# Patient Record
Sex: MALE | Race: OTHER | NOT HISPANIC OR LATINO | URBAN - METROPOLITAN AREA
[De-identification: names, ages, dates, MRNs, and addresses within clinical notes are randomized per-mention and may not be internally consistent; named-entity substitution may affect disease eponyms.]

---

## 2023-12-19 ENCOUNTER — HOSPITAL ENCOUNTER (EMERGENCY)
Facility: HOSPITAL | Age: 23
Discharge: HOME | End: 2023-12-19
Attending: EMERGENCY MEDICINE | Admitting: EMERGENCY MEDICINE
Payer: COMMERCIAL

## 2023-12-19 VITALS
TEMPERATURE: 98.3 F | RESPIRATION RATE: 18 BRPM | SYSTOLIC BLOOD PRESSURE: 118 MMHG | DIASTOLIC BLOOD PRESSURE: 58 MMHG | HEART RATE: 111 BPM | OXYGEN SATURATION: 100 %

## 2023-12-19 DIAGNOSIS — F10.929 ALCOHOL INTOXICATION (HCC): Primary | ICD-10-CM

## 2023-12-19 PROCEDURE — 99284 EMERGENCY DEPT VISIT MOD MDM: CPT

## 2023-12-19 PROCEDURE — 99283 EMERGENCY DEPT VISIT LOW MDM: CPT | Performed by: EMERGENCY MEDICINE

## 2023-12-19 NOTE — ED PROVIDER NOTES
History  Chief Complaint   Patient presents with    Alcohol Intoxication     Pt brought by EMS from [facility] for intoxication.     23-year-old male with no pertinent past medical history presenting emergency department after acute intoxication at the [facility] today.  Patient drank an unknown amount of alcohol and then was stumbling in the [facility] so EMS was called.  He denies other drug or substance use.  He does not how much he drank.  He denies any complaints otherwise at this time such as musculoskeletal pains, headache, nausea, vomiting, chest pain, shortness of breath, abdominal pain, or others.    None       No past medical history on file.    No past surgical history on file.    No family history on file.  I have reviewed and agree with the history as documented.    E-Cigarette/Vaping    E-Cigarette Use Never User      E-Cigarette/Vaping Substances     Social History     Tobacco Use    Smoking status: Never    Smokeless tobacco: Never   Vaping Use    Vaping status: Never Used   Substance Use Topics    Alcohol use: Yes     Comment: poor historian    Drug use: Never        Review of Systems   All other systems reviewed and are negative.      Physical Exam  ED Triage Vitals [12/19/23 1756]   Temperature Pulse Respirations Blood Pressure SpO2   98.3 °F (36.8 °C) (!) 111 18 118/58 100 %      Temp src Heart Rate Source Patient Position - Orthostatic VS BP Location FiO2 (%)   -- -- -- -- --      Pain Score       --             Orthostatic Vital Signs  Vitals:    12/19/23 1756   BP: 118/58   Pulse: (!) 111       Physical Exam  Vitals and nursing note reviewed.   Constitutional:       General: He is not in acute distress.     Appearance: He is well-developed.   HENT:      Head: Normocephalic and atraumatic.   Eyes:      Conjunctiva/sclera: Conjunctivae normal.   Cardiovascular:      Rate and Rhythm: Normal rate and regular rhythm.      Heart sounds: No murmur heard.  Pulmonary:      Effort: Pulmonary effort is normal. No  respiratory distress.      Breath sounds: Normal breath sounds.   Abdominal:      Palpations: Abdomen is soft.      Tenderness: There is no abdominal tenderness.   Musculoskeletal:         General: No swelling.      Cervical back: Neck supple.   Skin:     General: Skin is warm and dry.      Capillary Refill: Capillary refill takes less than 2 seconds.   Neurological:      General: No focal deficit present.      Mental Status: He is alert.   Psychiatric:      Comments: Intoxicated         ED Medications  Medications - No data to display    Diagnostic Studies  Results Reviewed       None                   No orders to display         Procedures  Procedures      ED Course  ED Course as of 12/27/23 0845   Tue Dec 19, 2023   1806 Will monitor in the emergency department until clinically sober                             SBIRT 22yo+      Flowsheet Row Most Recent Value   Initial Alcohol Screen: US AUDIT-C     1. How often do you have a drink containing alcohol? 0 Filed at: 12/19/2023 1758   Audit-C Score 0 Filed at: 12/19/2023 1758   ARSLAN: How many times in the past year have you...    Used an illegal drug or used a prescription medication for non-medical reasons? Never Filed at: 12/19/2023 1758                  Medical Decision Making  23-year-old male presenting to the emergency department due to acute intoxication.  Plan to be monitor patient until clinically sober and appropriate for discharge.  His rehab coordinator did come to the hospital and was able to set up a ride to detox within their system on discharge.  Patient had appropriate return to baseline and was discharged to a detox facility.          Disposition  Final diagnoses:   Alcohol intoxication (HCC)     Time reflects when diagnosis was documented in both MDM as applicable and the Disposition within this note       Time User Action Codes Description Comment    12/19/2023  6:45 PM Breezy Lind Add [F10.929] Alcohol intoxication (HCC)           ED  Disposition       ED Disposition   Discharge    Condition   Stable    Date/Time   Tue Dec 19, 2023 1230    Comment   Dave Ocampo discharge to home/self care.                   Follow-up Information       Follow up With Specialties Details Why Contact Info    [employer]    [phone]              There are no discharge medications for this patient.    No discharge procedures on file.    PDMP Review       None             ED Provider  Attending physically available and evaluated Dave Ocampo. I managed the patient along with the ED Attending.    Electronically Signed by           Breezy Lind MD  12/27/23 2860

## 2023-12-19 NOTE — ED ATTENDING ATTESTATION
12/19/2023  I, Hardeep Diallo MD, saw and evaluated the patient. I have discussed the patient with the resident/non-physician practitioner and agree with the resident's/non-physician practitioner's findings, Plan of Care, and MDM as documented in the resident's/non-physician practitioner's note, except where noted. All available labs and Radiology studies were reviewed.  I was present for key portions of any procedure(s) performed by the resident/non-physician practitioner and I was immediately available to provide assistance.       At this point I agree with the current assessment done in the Emergency Department.  I have conducted an independent evaluation of this patient a history and physical is as follows:  Etoh   intoxication     no injury  no ha   No vomiting or abd pain   Exam the patient is in no acute distress vital signs are stable afebrile HEENT exam normocephalic atraumatic lungs clear heart regular abdomen soft nontender neurologic Bill nonfocal  Impression alcohol intoxication  ED Course         Critical Care Time  Procedures

## 2023-12-19 NOTE — ED NOTES
Visitor at bedside, Osmar the admissions director for the rehab the pt resides at. Pt called Osmar to pick him up after not boarding plane to go home for the holidays but instead going to a casino and found intoxicated. Arrangements were made to send pt to [facility] detox residential program. Pt currently sleeping but is to call detox at [phone] when he wakes up for a screening call.  Osmar can be reached 24 / 7 at [phone].     Jeannette Dorman RN  12/19/23 2190

## 2023-12-20 NOTE — ED NOTES
Pt on phone with [facility] detox intake staff.     Jeannette Dorman RN  12/19/23 8411

## 2023-12-20 NOTE — ED NOTES
Elvi arranged by [facility] detox ([facility]).     Jeannette Dorman RN  12/19/23 6687

## 2023-12-20 NOTE — ED NOTES
Pt got out of bed walked to the desk, turned around and went back to sleep.     Jeannette Dorman RN  12/19/23 4951

## 2023-12-20 NOTE — ED NOTES
BAT ordered at pts rehab counselors request. Not done at this time due to not needing it anymore. Pt sleeping.     Jeannette Dorman RN  12/19/23 2037